# Patient Record
Sex: FEMALE | Race: WHITE | NOT HISPANIC OR LATINO | Employment: UNEMPLOYED | URBAN - METROPOLITAN AREA
[De-identification: names, ages, dates, MRNs, and addresses within clinical notes are randomized per-mention and may not be internally consistent; named-entity substitution may affect disease eponyms.]

---

## 2019-07-25 ENCOUNTER — HOSPITAL ENCOUNTER (OUTPATIENT)
Dept: SURGERY | Facility: CLINIC | Age: 24
Discharge: HOME | End: 2019-07-25
Attending: PHYSICIAN ASSISTANT

## 2019-07-25 ENCOUNTER — OFFICE VISIT CONVERTED (OUTPATIENT)
Dept: SURGERY | Facility: CLINIC | Age: 24
End: 2019-07-25
Attending: PHYSICIAN ASSISTANT

## 2019-07-27 LAB — BACTERIA UR CULT: NORMAL

## 2019-08-26 ENCOUNTER — OFFICE VISIT CONVERTED (OUTPATIENT)
Dept: SURGERY | Facility: CLINIC | Age: 24
End: 2019-08-26
Attending: PHYSICIAN ASSISTANT

## 2019-08-26 ENCOUNTER — CONVERSION ENCOUNTER (OUTPATIENT)
Dept: SURGERY | Facility: CLINIC | Age: 24
End: 2019-08-26

## 2019-08-26 ENCOUNTER — HOSPITAL ENCOUNTER (OUTPATIENT)
Dept: SURGERY | Facility: CLINIC | Age: 24
Discharge: HOME | End: 2019-08-26
Attending: PHYSICIAN ASSISTANT

## 2019-08-29 LAB
AMOXICILLIN+CLAV SUSC ISLT: <=2
AMPICILLIN SUSC ISLT: <=2
AMPICILLIN+SULBAC SUSC ISLT: <=2
BACTERIA UR CULT: ABNORMAL
CEFAZOLIN SUSC ISLT: <=4
CEFEPIME SUSC ISLT: <=1
CEFTAZIDIME SUSC ISLT: <=1
CEFTRIAXONE SUSC ISLT: <=1
CEFUROXIME ORAL SUSC ISLT: 4
CEFUROXIME PARENTER SUSC ISLT: 4
CIPROFLOXACIN SUSC ISLT: <=0.25
ERTAPENEM SUSC ISLT: <=0.5
GENTAMICIN SUSC ISLT: <=1
LEVOFLOXACIN SUSC ISLT: <=0.12
NITROFURANTOIN SUSC ISLT: <=16
TETRACYCLINE SUSC ISLT: <=1
TMP SMX SUSC ISLT: <=20
TOBRAMYCIN SUSC ISLT: <=1

## 2020-02-21 ENCOUNTER — CONVERSION ENCOUNTER (OUTPATIENT)
Dept: SURGERY | Facility: CLINIC | Age: 25
End: 2020-02-21

## 2020-02-21 ENCOUNTER — OFFICE VISIT CONVERTED (OUTPATIENT)
Dept: SURGERY | Facility: CLINIC | Age: 25
End: 2020-02-21
Attending: PHYSICIAN ASSISTANT

## 2021-05-15 VITALS — BODY MASS INDEX: 22.87 KG/M2 | HEIGHT: 62 IN | WEIGHT: 124.25 LBS | RESPIRATION RATE: 16 BRPM

## 2021-05-15 VITALS — RESPIRATION RATE: 16 BRPM | BODY MASS INDEX: 23.98 KG/M2 | HEIGHT: 62 IN | WEIGHT: 130.31 LBS

## 2021-05-15 VITALS — BODY MASS INDEX: 24.73 KG/M2 | HEIGHT: 62 IN | WEIGHT: 134.37 LBS | RESPIRATION RATE: 15 BRPM

## 2022-02-15 ENCOUNTER — TRANSCRIBE ORDERS (OUTPATIENT)
Dept: ADMINISTRATIVE | Facility: HOSPITAL | Age: 27
End: 2022-02-15

## 2022-02-15 DIAGNOSIS — N63.0 BREAST NODULE: Primary | ICD-10-CM

## 2022-03-03 ENCOUNTER — APPOINTMENT (OUTPATIENT)
Dept: ULTRASOUND IMAGING | Facility: HOSPITAL | Age: 27
End: 2022-03-03

## 2022-03-03 ENCOUNTER — APPOINTMENT (OUTPATIENT)
Dept: MAMMOGRAPHY | Facility: HOSPITAL | Age: 27
End: 2022-03-03

## 2022-03-18 ENCOUNTER — HOSPITAL ENCOUNTER (OUTPATIENT)
Dept: MAMMOGRAPHY | Facility: HOSPITAL | Age: 27
Discharge: HOME | End: 2022-03-18

## 2022-03-18 ENCOUNTER — HOSPITAL ENCOUNTER (OUTPATIENT)
Dept: ULTRASOUND IMAGING | Facility: HOSPITAL | Age: 27
Discharge: HOME | End: 2022-03-18

## 2022-03-18 DIAGNOSIS — N63.0 BREAST NODULE: ICD-10-CM

## 2022-03-18 PROCEDURE — 76642 ULTRASOUND BREAST LIMITED: CPT

## 2025-08-01 ENCOUNTER — OFFICE VISIT (OUTPATIENT)
Dept: FAMILY MEDICINE CLINIC | Facility: CLINIC | Age: 30
End: 2025-08-01
Payer: COMMERCIAL

## 2025-08-01 VITALS
HEIGHT: 62 IN | DIASTOLIC BLOOD PRESSURE: 72 MMHG | TEMPERATURE: 98.5 F | OXYGEN SATURATION: 99 % | BODY MASS INDEX: 28.16 KG/M2 | WEIGHT: 153 LBS | SYSTOLIC BLOOD PRESSURE: 108 MMHG | HEART RATE: 78 BPM

## 2025-08-01 DIAGNOSIS — L23.7 POISON IVY DERMATITIS: ICD-10-CM

## 2025-08-01 DIAGNOSIS — Z13.1 SCREENING FOR DIABETES MELLITUS: ICD-10-CM

## 2025-08-01 DIAGNOSIS — N39.0 FREQUENT URINARY TRACT INFECTIONS: Primary | ICD-10-CM

## 2025-08-01 DIAGNOSIS — Z11.59 NEED FOR HEPATITIS C SCREENING TEST: ICD-10-CM

## 2025-08-01 DIAGNOSIS — Z13.220 SCREENING FOR HYPERLIPIDEMIA: ICD-10-CM

## 2025-08-01 DIAGNOSIS — F17.200 TOBACCO USE DISORDER: ICD-10-CM

## 2025-08-01 DIAGNOSIS — I78.1 SPIDER ANGIOMA OF SKIN: ICD-10-CM

## 2025-08-01 LAB
ALBUMIN SERPL-MCNC: 4.2 G/DL (ref 3.5–5.2)
ALBUMIN/GLOB SERPL: 1.5 G/DL
ALP SERPL-CCNC: 77 U/L (ref 39–117)
ALT SERPL W P-5'-P-CCNC: 19 U/L (ref 1–33)
ANION GAP SERPL CALCULATED.3IONS-SCNC: 11.4 MMOL/L (ref 5–15)
AST SERPL-CCNC: 24 U/L (ref 1–32)
BILIRUB SERPL-MCNC: 0.2 MG/DL (ref 0–1.2)
BUN SERPL-MCNC: 5 MG/DL (ref 6–20)
BUN/CREAT SERPL: 7.2 (ref 7–25)
CALCIUM SPEC-SCNC: 9.3 MG/DL (ref 8.6–10.5)
CHLORIDE SERPL-SCNC: 104 MMOL/L (ref 98–107)
CHOLEST SERPL-MCNC: 219 MG/DL (ref 0–200)
CO2 SERPL-SCNC: 22.6 MMOL/L (ref 22–29)
CREAT SERPL-MCNC: 0.69 MG/DL (ref 0.57–1)
EGFRCR SERPLBLD CKD-EPI 2021: 119.9 ML/MIN/1.73
GLOBULIN UR ELPH-MCNC: 2.8 GM/DL
GLUCOSE SERPL-MCNC: 77 MG/DL (ref 65–99)
HBA1C MFR BLD: 5.4 % (ref 4.8–5.6)
HCV AB SER QL: NORMAL
HDLC SERPL-MCNC: 58 MG/DL (ref 40–60)
LDLC SERPL CALC-MCNC: 149 MG/DL (ref 0–100)
LDLC/HDLC SERPL: 2.54 {RATIO}
POTASSIUM SERPL-SCNC: 4.1 MMOL/L (ref 3.5–5.2)
PROT SERPL-MCNC: 7 G/DL (ref 6–8.5)
SODIUM SERPL-SCNC: 138 MMOL/L (ref 136–145)
TRIGL SERPL-MCNC: 67 MG/DL (ref 0–150)
VLDLC SERPL-MCNC: 12 MG/DL (ref 5–40)

## 2025-08-01 PROCEDURE — 80053 COMPREHEN METABOLIC PANEL: CPT | Performed by: STUDENT IN AN ORGANIZED HEALTH CARE EDUCATION/TRAINING PROGRAM

## 2025-08-01 PROCEDURE — 83036 HEMOGLOBIN GLYCOSYLATED A1C: CPT | Performed by: STUDENT IN AN ORGANIZED HEALTH CARE EDUCATION/TRAINING PROGRAM

## 2025-08-01 PROCEDURE — 86803 HEPATITIS C AB TEST: CPT | Performed by: STUDENT IN AN ORGANIZED HEALTH CARE EDUCATION/TRAINING PROGRAM

## 2025-08-01 PROCEDURE — 80061 LIPID PANEL: CPT | Performed by: STUDENT IN AN ORGANIZED HEALTH CARE EDUCATION/TRAINING PROGRAM

## 2025-08-01 RX ORDER — METHENAMINE HIPPURATE 1000 MG/1
1 TABLET ORAL 2 TIMES DAILY WITH MEALS
Qty: 60 TABLET | Refills: 2 | Status: SHIPPED | OUTPATIENT
Start: 2025-08-01

## 2025-08-02 PROBLEM — I78.1 SPIDER ANGIOMA OF SKIN: Status: ACTIVE | Noted: 2025-08-02

## 2025-08-02 NOTE — PROGRESS NOTES
Chief Complaint  Chief Complaint   Patient presents with    Establish Care     Has a history of UTI's   Veins both legs .  Poison ivy x 1 week, itches        Subjective       Rosio Enciso presents to [facility]    History of Present Illness  The patient presents for evaluation of UTI, poison ivy rash, and spider veins.    Urinary Tract Infection (UTI)  - She has a history of UTIs and was recently treated at urgent care for a suspected UTI.  - Antibiotics were prescribed after ketones and leukocytes were detected in her urine, but the culture was negative, leading to discontinuation.  - She reports no current symptoms.  - Her last UTI was 4-5 months ago.  - During her recent visit to urgent care, she experienced dysuria.  - Typically, she has 2-3 UTIs per year, possibly due to inadequate water intake.  - She has previously consulted a urologist for recurrent kidney infections.    Breast Lump  - She has a lump in her right breast, identified as a benign fatty mass on ultrasound.  - The lump becomes sensitive around her menstrual cycle and then subsides.  - She first noticed it after breastfeeding her daughter.  - Her last Pap smear was six years ago.    Poison Ivy Rash  - She has been using calamine lotion to treat a rash from potential poison ivy contact on 2025.  - The rash, initially resembling blisters, has reduced in size and become itchy.  - She believes the calamine lotion has been effective as the rash has not spread.    Spider Veins  - She reports spider veins on both legs and feet.    Social History:  Tobacco: Smokes half a pack a day for 15 years.    PAST SURGICAL HISTORY:  -  with tubal ligation  - [place] teeth extraction    FAMILY HISTORY  Father has heart failure and is prediabetic. Sister has Chiari malformation and had an aneurysm with a stent or mesh placed. Grandmother is missing a kidney and has problems with the other kidney.    Past Medical  "History:    Mononucleosis    Urinary tract infection         No Known Allergies       Past Surgical History:   Procedure Laterality Date     SECTION      x1    TUBAL ABDOMINAL LIGATION      WISDOM TOOTH EXTRACTION            Social History     Tobacco Use    Smoking status: Every Day     Current packs/day: 0.50     Average packs/day: 0.5 packs/day for 15.0 years (7.5 ttl pk-yrs)     Types: Cigarettes    Smokeless tobacco: Never   Vaping Use    Vaping status: Never Used   Substance Use Topics    Alcohol use: Never    Drug use: Never         Family History   Problem Relation Age of Onset    No Known Problems Mother     Heart failure Father     Chiari malformation Sister     Cerebral aneurysm Sister           No current outpatient medications on file prior to visit.     No current facility-administered medications on file prior to visit.           There is no immunization history on file for this patient.          Objective         /72 (BP Location: Right arm, Patient Position: Sitting)   Pulse 78   Temp 98.5 °F (36.9 °C) (Oral)   Ht 157.5 cm (62\")   Wt 69.4 kg (153 lb)   SpO2 99%   BMI 27.98 kg/m²           Physical Exam  Physical Exam  Constitutional:       General: She is not in acute distress.     Appearance: Normal appearance.   HENT:      Head: Normocephalic and atraumatic.      Mouth/Throat:      Mouth: Mucous membranes are moist.   Eyes:      Extraocular Movements: Extraocular movements intact.   Cardiovascular:      Rate and Rhythm: Normal rate and regular rhythm.      Heart sounds: No murmur heard.  Pulmonary:      Effort: No respiratory distress.      Breath sounds: No wheezing, rhonchi or rales.   Abdominal:      General: Abdomen is flat.   Musculoskeletal:      Cervical back: Neck supple.   Skin:     General: Skin is warm and dry.      Comments: Some vesicles seen on left forearm consistent with poison ivy rash. Calamine lotion in place.   Spider angiomas seen on bilateral lower legs. " Each lesion if about 1 cm in diameter.    Neurological:      General: No focal deficit present.      Mental Status: She is alert and oriented to person, place, and time. Mental status is at baseline.   Psychiatric:         Mood and Affect: Mood normal.         Thought Content: Thought content normal.         Judgment: Judgment normal.         Physical Exam        Result Review :    Results    Imaging   - Ultrasound of right breast: Benign fatty mass             Assessment and Plan     Diagnoses and all orders for this visit:    1. Tobacco use disorder (Primary)    2. Frequent urinary tract infections  -     methenamine (HIPREX) 1 g tablet; Take 1 tablet by mouth 2 (Two) Times a Day With Meals.  Dispense: 60 tablet; Refill: 2    3. Poison ivy dermatitis    4. Need for hepatitis C screening test  -     Hepatitis C Antibody    5. Screening for diabetes mellitus  -     Hemoglobin A1c    6. Screening for hyperlipidemia  -     Comprehensive Metabolic Panel  -     Lipid Panel        Assessment & Plan  1. UTI.  - History of UTIs with recent urgent care visit for dysuria.  - Initial tests showed ketones and leukocytes; culture was negative, antibiotics discontinued per patient report.  - no records available today  - No current symptoms.  - Methenamine prescribed, twice daily for one month with refills; return for evaluation if issues arise.    2. Fibrocystic changes.  - Lump in right breast identified as benign fatty mass on ultrasound.  - Lump becomes sensitive around menstrual cycle and then subsides.  - Consistent with fibrocystic changes, influenced by hormone fluctuations.  - No immediate intervention required.    3. Poison Lan[place] Rash.  - Rash developed after likely contact with poison ivy.  - Calamine lotion effective in reducing itchiness and preventing spread.  - Mild steroid lotion can be considered if rash worsens or spreads.    4. Spider Veins.  - Spider veins on legs and feet.  - Typically cosmetic, no treatment  necessary unless cosmetic intervention desired.    5. Tobacco use disorder  - not willing to quit today  - currently 4/10 on quit scale, continue to follow up    Follow-up: Follow up in a few months.              Follow Up   Return in about 2 months (around 10/1/2025) for Annual physical, with pap.    Patient was given instructions and counseling regarding her condition or for health maintenance advice. Please see specific information pulled into the AVS if appropriate.     Rosio Enciso  reports that she has been smoking cigarettes. She has a 7.5 pack-year smoking history. She has never used smokeless tobacco. I have educated her on the risk of diseases from using tobacco products such as cancer, COPD, and heart disease.     I advised her to quit and she is not willing to quit.    I spent 3.5 minutes counseling the patient.           Patient or patient representative verbalized consent for the use of Ambient Listening during the visit with  Myke Smith DO for chart documentation. 8/2/2025  10:57 EDT

## 2025-08-05 ENCOUNTER — TELEPHONE (OUTPATIENT)
Dept: FAMILY MEDICINE CLINIC | Facility: CLINIC | Age: 30
End: 2025-08-05
Payer: COMMERCIAL

## 2025-08-05 DIAGNOSIS — L24.7 IRRITANT CONTACT DERMATITIS DUE TO PLANTS, EXCEPT FOOD: Primary | ICD-10-CM

## 2025-08-08 RX ORDER — TRIAMCINOLONE ACETONIDE 1 MG/G
1 OINTMENT TOPICAL 2 TIMES DAILY
Qty: 30 G | Refills: 0 | Status: SHIPPED | OUTPATIENT
Start: 2025-08-08